# Patient Record
Sex: FEMALE | Race: BLACK OR AFRICAN AMERICAN | NOT HISPANIC OR LATINO | Employment: STUDENT | URBAN - METROPOLITAN AREA
[De-identification: names, ages, dates, MRNs, and addresses within clinical notes are randomized per-mention and may not be internally consistent; named-entity substitution may affect disease eponyms.]

---

## 2020-08-03 ENCOUNTER — HOSPITAL ENCOUNTER (EMERGENCY)
Facility: CLINIC | Age: 20
Discharge: HOME | End: 2020-08-03
Attending: EMERGENCY MEDICINE | Admitting: EMERGENCY MEDICINE
Payer: COMMERCIAL

## 2020-08-03 ENCOUNTER — APPOINTMENT (OUTPATIENT)
Dept: CT IMAGING | Facility: CLINIC | Age: 20
End: 2020-08-03
Attending: EMERGENCY MEDICINE
Payer: COMMERCIAL

## 2020-08-03 VITALS
OXYGEN SATURATION: 95 % | SYSTOLIC BLOOD PRESSURE: 116 MMHG | TEMPERATURE: 97.7 F | HEART RATE: 76 BPM | DIASTOLIC BLOOD PRESSURE: 83 MMHG | RESPIRATION RATE: 18 BRPM | WEIGHT: 172 LBS

## 2020-08-03 DIAGNOSIS — S06.0X9A CONCUSSION WITH LOSS OF CONSCIOUSNESS: ICD-10-CM

## 2020-08-03 DIAGNOSIS — R11.2 NAUSEA AND VOMITING: ICD-10-CM

## 2020-08-03 DIAGNOSIS — R55 SYNCOPE AND COLLAPSE: ICD-10-CM

## 2020-08-03 LAB
ALBUMIN SERPL-MCNC: 4.1 G/DL (ref 3.4–5)
ALBUMIN UR-MCNC: NEGATIVE MG/DL
ALP SERPL-CCNC: 66 U/L (ref 40–150)
ALT SERPL W P-5'-P-CCNC: 29 U/L (ref 0–50)
AMPHETAMINES UR QL SCN: NEGATIVE
ANION GAP SERPL CALCULATED.3IONS-SCNC: 6 MMOL/L (ref 3–14)
APPEARANCE UR: CLEAR
AST SERPL W P-5'-P-CCNC: 13 U/L (ref 0–45)
BARBITURATES UR QL: NEGATIVE
BASOPHILS # BLD AUTO: 0 10E9/L (ref 0–0.2)
BASOPHILS NFR BLD AUTO: 0.3 %
BENZODIAZ UR QL: NEGATIVE
BILIRUB SERPL-MCNC: 0.3 MG/DL (ref 0.2–1.3)
BILIRUB UR QL STRIP: NEGATIVE
BUN SERPL-MCNC: 7 MG/DL (ref 7–30)
CALCIUM SERPL-MCNC: 9.1 MG/DL (ref 8.5–10.1)
CANNABINOIDS UR QL SCN: POSITIVE
CHLORIDE SERPL-SCNC: 106 MMOL/L (ref 94–109)
CO2 SERPL-SCNC: 27 MMOL/L (ref 20–32)
COCAINE UR QL: NEGATIVE
COLOR UR AUTO: NORMAL
CREAT SERPL-MCNC: 0.5 MG/DL (ref 0.52–1.04)
DIFFERENTIAL METHOD BLD: ABNORMAL
EOSINOPHIL # BLD AUTO: 0.1 10E9/L (ref 0–0.7)
EOSINOPHIL NFR BLD AUTO: 1.1 %
ERYTHROCYTE [DISTWIDTH] IN BLOOD BY AUTOMATED COUNT: 15.3 % (ref 10–15)
ETHANOL SERPL-MCNC: <0.01 G/DL
ETHANOL UR QL SCN: NEGATIVE
GFR SERPL CREATININE-BSD FRML MDRD: >90 ML/MIN/{1.73_M2}
GLUCOSE SERPL-MCNC: 83 MG/DL (ref 70–99)
GLUCOSE UR STRIP-MCNC: NEGATIVE MG/DL
HCG UR QL: NEGATIVE
HCT VFR BLD AUTO: 39.9 % (ref 35–47)
HGB BLD-MCNC: 12.7 G/DL (ref 11.7–15.7)
HGB UR QL STRIP: NEGATIVE
IMM GRANULOCYTES # BLD: 0 10E9/L (ref 0–0.4)
IMM GRANULOCYTES NFR BLD: 0 %
KETONES UR STRIP-MCNC: NEGATIVE MG/DL
LEUKOCYTE ESTERASE UR QL STRIP: NEGATIVE
LIPASE SERPL-CCNC: 114 U/L (ref 73–393)
LYMPHOCYTES # BLD AUTO: 2.3 10E9/L (ref 0.8–5.3)
LYMPHOCYTES NFR BLD AUTO: 35.5 %
MCH RBC QN AUTO: 27.5 PG (ref 26.5–33)
MCHC RBC AUTO-ENTMCNC: 31.8 G/DL (ref 31.5–36.5)
MCV RBC AUTO: 87 FL (ref 78–100)
MONOCYTES # BLD AUTO: 0.4 10E9/L (ref 0–1.3)
MONOCYTES NFR BLD AUTO: 6.1 %
NEUTROPHILS # BLD AUTO: 3.7 10E9/L (ref 1.6–8.3)
NEUTROPHILS NFR BLD AUTO: 57 %
NITRATE UR QL: NEGATIVE
NRBC # BLD AUTO: 0 10*3/UL
NRBC BLD AUTO-RTO: 0 /100
OPIATES UR QL SCN: NEGATIVE
PH UR STRIP: 6.5 PH (ref 5–7)
PLATELET # BLD AUTO: 386 10E9/L (ref 150–450)
POTASSIUM SERPL-SCNC: 3.9 MMOL/L (ref 3.4–5.3)
PROT SERPL-MCNC: 8.1 G/DL (ref 6.8–8.8)
RBC # BLD AUTO: 4.61 10E12/L (ref 3.8–5.2)
SODIUM SERPL-SCNC: 139 MMOL/L (ref 133–144)
SOURCE: NORMAL
SP GR UR STRIP: 1.01 (ref 1–1.03)
TSH SERPL DL<=0.005 MIU/L-ACNC: 1.38 MU/L (ref 0.4–4)
UROBILINOGEN UR STRIP-MCNC: NORMAL MG/DL (ref 0–2)
WBC # BLD AUTO: 6.5 10E9/L (ref 4–11)

## 2020-08-03 PROCEDURE — 99285 EMERGENCY DEPT VISIT HI MDM: CPT | Mod: 25

## 2020-08-03 PROCEDURE — 85025 COMPLETE CBC W/AUTO DIFF WBC: CPT | Performed by: EMERGENCY MEDICINE

## 2020-08-03 PROCEDURE — 25800030 ZZH RX IP 258 OP 636: Performed by: EMERGENCY MEDICINE

## 2020-08-03 PROCEDURE — 96374 THER/PROPH/DIAG INJ IV PUSH: CPT

## 2020-08-03 PROCEDURE — 80320 DRUG SCREEN QUANTALCOHOLS: CPT | Performed by: EMERGENCY MEDICINE

## 2020-08-03 PROCEDURE — 80053 COMPREHEN METABOLIC PANEL: CPT | Performed by: EMERGENCY MEDICINE

## 2020-08-03 PROCEDURE — 99285 EMERGENCY DEPT VISIT HI MDM: CPT | Mod: 25 | Performed by: EMERGENCY MEDICINE

## 2020-08-03 PROCEDURE — 96375 TX/PRO/DX INJ NEW DRUG ADDON: CPT

## 2020-08-03 PROCEDURE — 80320 DRUG SCREEN QUANTALCOHOLS: CPT | Mod: 91 | Performed by: EMERGENCY MEDICINE

## 2020-08-03 PROCEDURE — 25000128 H RX IP 250 OP 636: Performed by: EMERGENCY MEDICINE

## 2020-08-03 PROCEDURE — 93010 ELECTROCARDIOGRAM REPORT: CPT | Mod: Z6 | Performed by: EMERGENCY MEDICINE

## 2020-08-03 PROCEDURE — 96361 HYDRATE IV INFUSION ADD-ON: CPT

## 2020-08-03 PROCEDURE — 84443 ASSAY THYROID STIM HORMONE: CPT | Performed by: EMERGENCY MEDICINE

## 2020-08-03 PROCEDURE — 80307 DRUG TEST PRSMV CHEM ANLYZR: CPT | Performed by: EMERGENCY MEDICINE

## 2020-08-03 PROCEDURE — 83690 ASSAY OF LIPASE: CPT | Performed by: EMERGENCY MEDICINE

## 2020-08-03 PROCEDURE — 81025 URINE PREGNANCY TEST: CPT | Performed by: EMERGENCY MEDICINE

## 2020-08-03 PROCEDURE — 70450 CT HEAD/BRAIN W/O DYE: CPT

## 2020-08-03 PROCEDURE — 93005 ELECTROCARDIOGRAM TRACING: CPT

## 2020-08-03 PROCEDURE — 81003 URINALYSIS AUTO W/O SCOPE: CPT | Performed by: EMERGENCY MEDICINE

## 2020-08-03 RX ORDER — LORAZEPAM 2 MG/ML
0.5 INJECTION INTRAMUSCULAR ONCE
Status: COMPLETED | OUTPATIENT
Start: 2020-08-03 | End: 2020-08-03

## 2020-08-03 RX ORDER — ONDANSETRON 4 MG/1
8 TABLET, ORALLY DISINTEGRATING ORAL EVERY 8 HOURS PRN
Qty: 10 TABLET | Refills: 0 | Status: SHIPPED | OUTPATIENT
Start: 2020-08-03 | End: 2020-08-06

## 2020-08-03 RX ORDER — SODIUM CHLORIDE 9 MG/ML
INJECTION, SOLUTION INTRAVENOUS CONTINUOUS
Status: DISCONTINUED | OUTPATIENT
Start: 2020-08-03 | End: 2020-08-04 | Stop reason: HOSPADM

## 2020-08-03 RX ORDER — ONDANSETRON 2 MG/ML
4 INJECTION INTRAMUSCULAR; INTRAVENOUS EVERY 30 MIN PRN
Status: DISCONTINUED | OUTPATIENT
Start: 2020-08-03 | End: 2020-08-04 | Stop reason: HOSPADM

## 2020-08-03 RX ADMIN — SODIUM CHLORIDE 1000 ML: 9 INJECTION, SOLUTION INTRAVENOUS at 20:40

## 2020-08-03 RX ADMIN — LORAZEPAM 0.5 MG: 2 INJECTION INTRAMUSCULAR; INTRAVENOUS at 20:44

## 2020-08-03 RX ADMIN — SODIUM CHLORIDE 1000 ML: 9 INJECTION, SOLUTION INTRAVENOUS at 21:07

## 2020-08-03 RX ADMIN — ONDANSETRON 4 MG: 2 INJECTION INTRAMUSCULAR; INTRAVENOUS at 20:42

## 2020-08-03 RX ADMIN — SODIUM CHLORIDE: 9 INJECTION, SOLUTION INTRAVENOUS at 23:13

## 2020-08-03 NOTE — ED AVS SNAPSHOT
[facility], [place], Emergency Department  [address]  [facility] [address]  Phone:  [phone]  Fax:  [phone]                                    Gerry Marquis   MRN: [id number]    Department:  [facility], Emergency Department   Date of Visit:  8/3/2020           After Visit Summary Signature Page    I have received my discharge instructions, and my questions have been answered. I have discussed any challenges I see with this plan with the nurse or doctor.    ..........................................................................................................................................  Patient/Patient Representative Signature      ..........................................................................................................................................  Patient Representative Print Name and Relationship to Patient    ..................................................               ................................................  Date                                   Time    ..........................................................................................................................................  Reviewed by Signature/Title    ...................................................              ..............................................  Date                                               Time          22EPIC Rev 08/18

## 2020-08-04 LAB — INTERPRETATION ECG - MUSE: NORMAL

## 2020-08-04 NOTE — DISCHARGE INSTRUCTIONS
You were seen today at [facility] for a concussion with loss of consciousness. Your Head CT scan was normal and labs showed no anemia or hypoglycemia. Please follow up with GI doctors regarding your vomiting. Please take zofran for nausea. Also take omeprazole every morning for one month.

## 2020-08-04 NOTE — ED TRIAGE NOTES
"Pt was accompanying family member for mental health eval when pt heard news of sisters suicide attempt which resulted in pt fainting.  Pt in triage talking hx of panic attacks.  Currently pt is talking about purse feeling like \"snakes\".  Pt states she can't see and her vision is gone. States that everything is \"gray\".  Pt did hit head during event  "

## 2020-08-04 NOTE — ED PROVIDER NOTES
"ED Provider Note  [facility]      History     Chief Complaint   Patient presents with     Loss of Consciousness     pt was with sister and heard some trauma information, pt fainted     Eye Problem     pt stated \"I can't see\"     HPI  Gerry Marquis is a 19 year old female with history of panic disorder who presents after suffering LOC resulting in hitting head on floor. Patient notes that for the past week she has been unable to sleep well and has also been experiencing emesis following meals. Starting two days ago, the emesis became \"dark red\".     Earlier today, she was accompanying her sister to sister's mental health evaluation at [facility] when the patient had LOC and hit head on floor. She was told that she started hyperventilating just prior to syncope. Although she has a history of panic attacks, Ms. Marquis has not experienced one in 3 years - she now controls them with breathing exercises - and she denies having one today.    After LOC, patient reports waking up on the floor and her vision was initially \"black\". Now she reports that her vision has improved to \"gray\", but the \"world is shaking\" (clarified to room spinning vertigo). Her head still hurts and \"feels like squashed jello\". No emesis after LOC. Patient reports experiencing intermittent palpitations, but does not recall any prior to fall.    Ms. Marquis brought her sister to [facility] today after sister reported a suicide attempt today via medication overdose and the patient had just learned that sister had also attempted suicide by hanging just prior to LOC. Patient denies that learning of sister's suicide attempts resulted in syncope or panic attack, since sister has multiple attempts in the past.    Past Medical History  History reviewed. No pertinent past medical history.  No past surgical history on file.  omeprazole (PRILOSEC) 20 MG DR capsule  ondansetron (ZOFRAN ODT) 4 MG ODT tab      No Known Allergies  Past medical " history, past surgical history, medications, and allergies were reviewed with the patient. Additional pertinent items: None    Family History  No family history on file.  Family history was reviewed with the patient. Additional pertinent items: None    Social History  Social History     Tobacco Use     Smoking status: None   Substance Use Topics     Alcohol use: None     Drug use: None      Social history was reviewed with the patient. Additional pertinent items: smokes marijuana occasionally with most recent being about 3 days ago.    Review of Systems  A complete review of systems was performed with pertinent positives and negatives noted in the HPI, and all other systems negative.    Physical Exam   BP: 120/80  Temp: 97.7  F (36.5  C)  Resp: 20  Weight: 78 kg (172 lb)(pt stated, unstable on feet)  SpO2: 100 %  Physical Exam  Constitutional:       General: She is in acute distress.      Appearance: She is well-developed. She is not ill-appearing or diaphoretic.      Comments: Patient appears anxious with intermittent tearfulness. Pauses multiple times to regulate emotions through breathing.   HENT:      Head: Normocephalic and atraumatic.   Eyes:      General: No visual field deficit or scleral icterus.     Extraocular Movements: Extraocular movements intact.   Neck:      Musculoskeletal: Normal range of motion and neck supple.   Cardiovascular:      Rate and Rhythm: Normal rate and regular rhythm.      Heart sounds: Normal heart sounds. No murmur. No friction rub. No gallop.    Pulmonary:      Effort: Pulmonary effort is normal. No respiratory distress.      Breath sounds: Normal breath sounds.   Abdominal:      General: Bowel sounds are normal. There is no distension.      Palpations: Abdomen is soft. There is no mass.      Tenderness: There is abdominal tenderness. There is no guarding or rebound.      Comments: Diffuse mild tenderness to palpation, most prominent on LLQ   Skin:     General: Skin is warm and  dry.      Coloration: Skin is not pale.      Findings: No erythema or rash.   Neurological:      Mental Status: She is alert and oriented to person, place, and time.      GCS: GCS eye subscore is 4. GCS verbal subscore is 5. GCS motor subscore is 6.      Cranial Nerves: No dysarthria or facial asymmetry.      Comments: Patient reports increased sensitivity to touch in V1,V2, and V3 regions. Facial symmetry intact, EOMI, right  and dorsi- & plantar-flexion slightly weaker than on left.   Psychiatric:         Attention and Perception: Attention and perception normal.         Mood and Affect: Mood is anxious. Affect is labile.         Speech: Speech normal.         Behavior: Behavior normal. Behavior is cooperative.         Thought Content: Thought content normal.         Judgment: Judgment normal.         ED Course      Procedures             EKG Interpretation:      Interpreted by Ronal Ley  Time reviewed: 22:00  Symptoms at time of EKG: LOC   Rhythm: normal sinus   Rate: normal  Axis: normal  Ectopy: none  Conduction: normal  ST Segments/ T Waves: No ST-T wave changes  Q Waves: none  Comparison to prior: No old EKG available    Clinical Impression: normal EKG               Results for orders placed or performed during the hospital encounter of 08/03/20   CT Head w/o Contrast     Status: None    Narrative    EXAM: CT HEAD W/O CONTRAST  LOCATION: [facility]  DATE/TIME: 8/3/2020 10:42 PM    INDICATION: Head trauma, visual loss  COMPARISON: None.  TECHNIQUE: Routine without IV contrast. Multiplanar reformats. Dose reduction techniques were used.    FINDINGS:  INTRACRANIAL CONTENTS: No intracranial hemorrhage, extraaxial collection, or mass effect.  No CT evidence of acute infarct. Normal parenchymal attenuation. Normal ventricles and sulci.     VISUALIZED ORBITS/SINUSES/MASTOIDS: No intraorbital abnormality. No paranasal sinus mucosal disease. No middle ear or mastoid effusion.    BONES/SOFT  TISSUES: No acute abnormality.      Impression    IMPRESSION:  1.  No acute intracranial process.   CBC with platelets differential     Status: Abnormal   Result Value Ref Range    WBC 6.5 4.0 - 11.0 10e9/L    RBC Count 4.61 3.8 - 5.2 10e12/L    Hemoglobin 12.7 11.7 - 15.7 g/dL    Hematocrit 39.9 35.0 - 47.0 %    MCV 87 78 - 100 fl    MCH 27.5 26.5 - 33.0 pg    MCHC 31.8 31.5 - 36.5 g/dL    RDW 15.3 (H) 10.0 - 15.0 %    Platelet Count 386 150 - 450 10e9/L    Diff Method Automated Method     % Neutrophils 57.0 %    % Lymphocytes 35.5 %    % Monocytes 6.1 %    % Eosinophils 1.1 %    % Basophils 0.3 %    % Immature Granulocytes 0.0 %    Nucleated RBCs 0 0 /100    Absolute Neutrophil 3.7 1.6 - 8.3 10e9/L    Absolute Lymphocytes 2.3 0.8 - 5.3 10e9/L    Absolute Monocytes 0.4 0.0 - 1.3 10e9/L    Absolute Eosinophils 0.1 0.0 - 0.7 10e9/L    Absolute Basophils 0.0 0.0 - 0.2 10e9/L    Abs Immature Granulocytes 0.0 0 - 0.4 10e9/L    Absolute Nucleated RBC 0.0    Comprehensive metabolic panel     Status: Abnormal   Result Value Ref Range    Sodium 139 133 - 144 mmol/L    Potassium 3.9 3.4 - 5.3 mmol/L    Chloride 106 94 - 109 mmol/L    Carbon Dioxide 27 20 - 32 mmol/L    Anion Gap 6 3 - 14 mmol/L    Glucose 83 70 - 99 mg/dL    Urea Nitrogen 7 7 - 30 mg/dL    Creatinine 0.50 (L) 0.52 - 1.04 mg/dL    GFR Estimate >90 >60 mL/min/[1.73_m2]    GFR Estimate If Black >90 >60 mL/min/[1.73_m2]    Calcium 9.1 8.5 - 10.1 mg/dL    Bilirubin Total 0.3 0.2 - 1.3 mg/dL    Albumin 4.1 3.4 - 5.0 g/dL    Protein Total 8.1 6.8 - 8.8 g/dL    Alkaline Phosphatase 66 40 - 150 U/L    ALT 29 0 - 50 U/L    AST 13 0 - 45 U/L   TSH with free T4 reflex     Status: None   Result Value Ref Range    TSH 1.38 0.40 - 4.00 mU/L   UA reflex to Microscopic and Culture     Status: None    Specimen: Urine clean catch; Midstream Urine   Result Value Ref Range    Color Urine Light Yellow     Appearance Urine Clear     Glucose Urine Negative NEG^Negative mg/dL     Bilirubin Urine Negative NEG^Negative    Ketones Urine Negative NEG^Negative mg/dL    Specific Gravity Urine 1.010 1.003 - 1.035    Blood Urine Negative NEG^Negative    pH Urine 6.5 5.0 - 7.0 pH    Protein Albumin Urine Negative NEG^Negative mg/dL    Urobilinogen mg/dL Normal 0.0 - 2.0 mg/dL    Nitrite Urine Negative NEG^Negative    Leukocyte Esterase Urine Negative NEG^Negative    Source Midstream Urine    HCG qualitative urine (UPT)     Status: None   Result Value Ref Range    HCG Qual Urine Negative NEG^Negative   Drug abuse screen 6 urine (chem dep)     Status: Abnormal   Result Value Ref Range    Amphetamine Qual Urine Negative NEG^Negative    Barbiturates Qual Urine Negative NEG^Negative    Benzodiazepine Qual Urine Negative NEG^Negative    Cannabinoids Qual Urine Positive (A) NEG^Negative    Cocaine Qual Urine Negative NEG^Negative    Ethanol Qual Urine Negative NEG^Negative    Opiates Qualitative Urine Negative NEG^Negative   Lipase     Status: None   Result Value Ref Range    Lipase 114 73 - 393 U/L   Alcohol ethyl     Status: None   Result Value Ref Range    Ethanol g/dL <0.01 <0.01 g/dL   EKG 12-lead, tracing only     Status: None (Preliminary result)   Result Value Ref Range    Interpretation ECG Click View Image link to view waveform and result      Medications   0.9% sodium chloride BOLUS (0 mLs Intravenous Stopped 8/3/20 2107)     Followed by   0.9% sodium chloride BOLUS (0 mLs Intravenous Stopped 8/3/20 2207)     Followed by   sodium chloride 0.9% infusion ( Intravenous New Bag 8/3/20 2313)   ondansetron (ZOFRAN) injection 4 mg (4 mg Intravenous Given 8/3/20 2042)   LORazepam (ATIVAN) injection 0.5 mg (0.5 mg Intravenous Given 8/3/20 2044)        Assessments & Plan (with Medical Decision Making)   Gerry Marquis is a 19 year old female with history of panic disorder who presents to the ED following LOC complicated by head trauma. Differential for LOC includes dehydration, hypoglycemia, anemia,  "cardiogenic/arrhythmia, hypothyroidism, hyperventilation, vasovagal, and seizure, among others. Glucose, hemoglobin, and TSH are normal, indicating no hypoglycemia, anemia, or hypothyroidism at time of episode.  Given history of panic attacks and reported hyperventilation prior to syncope, most likely etiology is either panic attack or vasovagal syncope. Cardiogenic etiology cannot be excluded given patient's report of intermittent palpitations in the past.     Visual changes most likely due to combination of concussion and anxiety. Non contrast Head CT was negative for hemorrhage. Visual changes reported to be improving after IVF, zofran, and ativan given.    Differential for emesis could be pregnancy, anxiety, or SBO, among others. SBO unlikely in absence of abdominal pain. Negative UPT rules out pregnancy. Cyclic vomiting syndrome possible given Utox positive for cannaniboids, although patient reports \"rarely\" smoking pot. Somatic manifestation of anxiety also possible given increased anxiety and recent difficulty sleeping in context of panic disorder. Importantly, hemoglobin on presentation is normal, so acute blood loss/upper GI bleed unlikely. Patient to follow up with GI regarding emesis.    I have reviewed the nursing notes. I have reviewed the findings, diagnosis, plan and need for follow up with the patient.    New Prescriptions    OMEPRAZOLE (PRILOSEC) 20 MG DR CAPSULE    Take 1 capsule (20 mg) by mouth daily    ONDANSETRON (ZOFRAN ODT) 4 MG ODT TAB    Take 2 tablets (8 mg) by mouth every 8 hours as needed for nausea       Final diagnoses:   Concussion with loss of consciousness   Nausea and vomiting   Fawad Madrigal MD, PhD  Resident Physician    --  Fawad Madrigal   Emergency Medicine   [facility], EMERGENCY DEPARTMENT  8/3/2020    Medical decision makin-year-old female presents to us with a chief complaint of syncope.  She did seem to have visual changes after she awoke.  She also reports " recent recurrent nausea and vomiting.  In terms of her syncope and head injury CT scan was unremarkable.  Patient likely experienced a concussion as her symptoms did resolve after some observation time in the emergency department.  I do not have another obvious cause of her syncope.  Patient may have been somewhat dehydrated as she reported significant nausea and vomiting.  We will give the patient a prescription for Zofran.  She does not have any abdominal tenderness.  We will give the patient a prescription for omeprazole as she may have some GERD that is causing this recurrent nausea and vomiting.  Recommend she follow-up with GI as well.  Patient is feeling better at this point comfortable discharge home and the plan.    This data collected with the Resident working in the Emergency Department.  Patient was seen and evaluated by myself and I repeated the history and physical exam with the patient.  The plan of care was discussed with them.  The key portions of the note including the entire assessment and plan reflect my documentation.                 Ronal Ley,   08/04/20 [id number]

## 2020-08-04 NOTE — ED NOTES
Patient Verbalized understanding of discharge instructions including medication administration and recommended follow up care as noted on discharge instructions. Written discharge instructions given, denies any further questions. Prescriptions: were printed and sent with patient.

## 2024-05-19 ENCOUNTER — VIRTUAL VISIT (OUTPATIENT)
Dept: URGENT CARE | Facility: CLINIC | Age: 24
End: 2024-05-19
Payer: COMMERCIAL

## 2024-05-19 DIAGNOSIS — Z53.9 NO SHOW: Primary | ICD-10-CM

## 2024-05-19 PROCEDURE — 99207 PR NO CHARGE LOS: CPT

## 2024-05-19 NOTE — PROGRESS NOTES
Gerry is a 23 year old who is being evaluated via a billable telephone visit.    What phone number would you like to be contacted at? [phone]  How would you like to obtain your AVS? Mail a copy  Originating Location (pt. Location): Home    Distant Location (provider location):  Off-site    No show for appointment     Subjective   Gerry is a 23 year old, presenting for the following health issues:  No chief complaint on file.    HPI     Called 129 pm - left message   Called 137 asked to reschedule appointment   Called once more and no answer 142  No show       UTI  Ear infection

## 2024-05-21 ENCOUNTER — VIRTUAL VISIT (OUTPATIENT)
Dept: URGENT CARE | Facility: CLINIC | Age: 24
End: 2024-05-21
Payer: COMMERCIAL

## 2024-05-21 DIAGNOSIS — N30.00 ACUTE CYSTITIS WITHOUT HEMATURIA: ICD-10-CM

## 2024-05-21 DIAGNOSIS — H92.03 EARACHE SYMPTOMS IN BOTH EARS: Primary | ICD-10-CM

## 2024-05-21 PROCEDURE — 99441 PR PHYSICIAN TELEPHONE EVALUATION 5-10 MIN: CPT | Mod: 93 | Performed by: EMERGENCY MEDICINE

## 2024-05-21 RX ORDER — CEPHALEXIN 500 MG/1
500 CAPSULE ORAL 3 TIMES DAILY
Qty: 30 CAPSULE | Refills: 0 | Status: SHIPPED | OUTPATIENT
Start: 2024-05-21 | End: 2024-05-31

## 2024-05-21 NOTE — PROGRESS NOTES
Phone appointment  Time: 5 minutes        CHIEF COMPLAINT: UTI symptoms.  Ear pain.      HPI: Patient is a 23-year-old female whose had a 4-day history of UTI symptoms including dysuria frequency and urgency.  She is also getting some slight back pain.  She also complains of both ears hurting her right greater than left.  No drainage.      ROS: See HPI otherwise normal.    No Known Allergies   Current Outpatient Medications   Medication Sig Dispense Refill    cephALEXin (KEFLEX) 500 MG capsule Take 1 capsule (500 mg) by mouth 3 times daily for 10 days 30 capsule 0         PE: No acute distress on phone appointment.  She is alert and oriented.  She is nondyspneic sounding speaking in full sentences.        TREATMENT: None.      ASSESSMENT: 4-day history of UTI symptoms now with lower back pain.  Due to the fact she is also having ear pain I will extend her treatment to 10 days      DIAGNOSIS: UTI.  Bilateral earache      PLAN: Cephalexin 3 times daily for 10 days.  Follow-up 4 to 5 days if ears continue to hurt or persistent UTI symptoms.